# Patient Record
(demographics unavailable — no encounter records)

---

## 2024-12-11 NOTE — PROCEDURE
[FreeTextEntry3] : Large Joint Injection was performed because of pain and inflammation.   Anesthesia: ethyl chloride sprayed topically..  Kenalog: An injection of Kenalog 40 mg , 1 cc.   Lidocaine: 3 cc.   Medication was injected in the left bicipital grove. Patient has tried OTC's including aspirin, Ibuprofen, Aleve etc or prescription NSAIDS, and/or exercises at home and/ or physical therapy without satisfactory response and Patient has decreased mobility in the joint. After verbal consent using sterile preparation and technique. The risks, benefits, and alternatives to cortisone injection were explained in full to the patient. Risks outlined include but are not limited to infection, sepsis, bleeding, scarring, skin discoloration, temporary increase in pain, syncopal episode, failure to resolve symptoms, allergic reaction, symptom recurrence, and elevation of blood sugar in diabetics. Patient understood the risks. All questions were answered. After discussion of options, patient requested an injection. Oral informed consent was obtained and sterile prep was done of the injection site. Sterile technique was utilized for the procedure including the preparation of the solutions used for the injection. Patient tolerated the procedure well. Advised to ice the injection site this evening. Prep with alcohol locally to site. Sterile technique used. Patient tolerated procedure well. Post Procedure Instructions: Patient was advised to call if redness, pain, or fever occur and apply ice for 15 min. out of every hour for the next 12-24 hours as tolerated. patient was advised to rest the joint(s) for 7 days.   Ultrasound Guidance was used for the following reasons: prior failure or difficult injection.   Ultrasound guided injection was performed of the shoulder, visualization of the needle and placement of injection was performed without complication.

## 2024-12-11 NOTE — HISTORY OF PRESENT ILLNESS
[de-identified] : This is a 82yo male presenting to the office c/o ongoing left shoulder pain x couple days. Pt went to [facility] and was given a 3 day prednisone taper with excellent relief. Pt reports he overdid it over the last couple of days and pain returned. Patient reports pain radiates down to the wrist. Pain is described as constant, severe in quality. Currently pain is 7/10 and non-radiating. Patient reports pain has been getting progressively worse. Pain is worse at night. Overall pain does improve with rest and ice. Patient denies any numbness or tingling. ~Pain is localized to the anterior and lateral aspect of the shoulder.

## 2024-12-11 NOTE — PHYSICAL EXAM
[No bony abnormalities] : No bony abnormalities [Disc space narrowing] : Disc space narrowing [Type 2 acromion] : Type 2 acromion [Calcific density] : Calcific density [There are no fractures, subluxations or dislocations. No significant abnormalities are seen] : There are no fractures, subluxations or dislocations. No significant abnormalities are seen [de-identified] : Constitutional: The general appearance of the patient is well developed, well nourished, no deformities and well groomed. Normal   Gait: Gait and function is as follows: normal gait.   Skin: Head and neck visualized skin is normal. Left upper extremity visualized skin is normal. Right upper extremity visualized skin is normal. Thoracic Skin of the thoracic spine shows visualized skin is normal.   Cardiovascular: palpable radial pulse bilaterally, good capillary refill in digits of the bilateral upper extremities and no temperature or color changes in the bilateral upper extremities.   Lymphatic: Normal Palpation of lymph nodes in the cervical.   Neurologic: fine motor control in the bilateral upper extremities is intact. Deep Tendon Reflexes in Upper and Lower Extremities Negative Story's in the bilateral upper extremities. The patient is oriented to time, place and person. Sensation to light touch intact in the bilateral upper extremities. Mood and Affect is normal.   Right Shoulder: Inspection of the shoulder/upper arm is as follows: There is mild pain with range of motion of the shoulder   Left Shoulder: Inspection of the shoulder/upper arm is as follows: no scapula winging, no biceps deformity and no AC joint deformity. Palpation of the shoulder/upper arm is as follows: There is tenderness at the proximal biceps tendon. Range of motion of the shoulder is as follows: Pain with internal rotation, external rotation, abduction and forward flexion. Strength of the shoulder is as follows: Supraspinatus 4/5. External Rotation 4/5. Internal Rotation 4/5. Deltoid 5/5 Ligament Stability and Special Tests of the shoulder is as follows: Neer test is positive. De Oliveira' test is positive. Speed's test is positive.   Neck:   Inspection / Palpation of the cervical spine is as follows: mild paracervical tenderness. Range of motion of the cervical spine is as follows: moderately decreased range of motion of the cervical spine. Stability testing for the cervical spine is as follows Stable range of motion.   Back, including spine: Inspection / Palpation of the thoracic/lumbar spine is as follows: There is a full, pain free, stable range of motion of the thoracic spine with a normal tone and not tenderness to palpation..

## 2024-12-11 NOTE — DISCUSSION/SUMMARY
[de-identified] : This is a 82yo male presenting to the office c/o ongoing left shoulder pain x couple days. I personally reviewed shoulder xrays which demonstrates calcium deposit  I personally reviewed cervical xrays which demonstrate mild narrowing   Pt was treated with a biceps injection for diagnostic and therapeutic purposes Recommended a Medrol Dosepak and discussed the dosing schedule and side effects of prednisone. We also recommend the patient contact their primary care physician to discuss any potential interactions with their current medications or health issues.   (1) We discussed a comprehensive treatment plans that included a prescription management plan involving the use of prescription strength medications to include Ibuprofen 600-800 mg TID, versus 500-650 mg Tylenol. We also discussed prescribing topical diclofenac (Voltaren gel) as well as once daily Meloxicam 15 mg. (2) The patient has More Than One chronic injuries/illnesses as outlined, discussed, and documented by ICD 10 codes listed, as well as the HPI and Plan section. There is a moderate risk of morbidity with further treatment, especially from use of prescription strength medications and possible side effects of these medications which include upset stomach and cardiac/renal issues with long term use were discussed. (3) I recommended that the patient follow-up with their medical physician to discuss any significant specific potential issues with long term use such as interactions with current medications or with exacerbation of underlying medical morbidities.   Attestation: I, Chantell SANTOS'Damaris , attest that this documentation has been prepared under the direction and in the presence of Provider Dru Restrepo MD. The documentation recorded by the scribe, in my presence, accurately reflects the service I personally performed, and the decisions made by me with my edits as appropriate. Dru Restrepo MD

## 2024-12-16 NOTE — PROCEDURE
[FreeTextEntry3] : Large Joint Injection was performed because of pain and inflammation.  Anesthesia: ethyl chloride sprayed topically..  Depomedrol: An injection of Depomedrol 40 mg , 1 cc.  Lidocaine: 7 cc.   Medication was injected in the left subacromial space. Patient has tried OTC's including aspirin, Ibuprofen, Aleve etc or prescription NSAIDS, and/or exercises at home and/ or physical therapy without satisfactory response and Patient has decreased mobility in the joint. After verbal consent using sterile preparation and technique. The risks, benefits, and alternatives to cortisone injection were explained in full to the patient. Risks outlined include but are not limited to infection, sepsis, bleeding, scarring, skin discoloration, temporary increase in pain, syncopal episode, failure to resolve symptoms, allergic reaction, symptom recurrence, and elevation of blood sugar in diabetics. Patient understood the risks. All questions were answered. After discussion of options, patient requested an injection. Oral informed consent was obtained and sterile prep was done of the injection site. Sterile technique was utilized for the procedure including the preparation of the solutions used for the injection. Patient tolerated the procedure well. Advised to ice the injection site this evening. Prep with alcohol locally to site. Sterile technique used. Patient tolerated procedure well. Post Procedure Instructions: Patient was advised to call if redness, pain, or fever occur and apply ice for 15 min. out of every hour for the next 12-24 hours as tolerated. patient was advised to rest the joint(s) for 7 days.

## 2024-12-16 NOTE — HISTORY OF PRESENT ILLNESS
[de-identified] : This is a 82yo male presenting to the office c/o ongoing left shoulder pain x couple days. Pt went to [facility] and was given a 3 day prednisone taper with excellent relief. Pt reports he overdid it over the last couple of days and pain returned. Patient reports pain radiates down to the wrist. Pain is described as constant, severe in quality. Currently pain is 7/10 and non-radiating. Patient reports pain has been getting progressively worse. Pain is worse at night. Overall pain does improve with rest and ice. Patient denies any numbness or tingling. ~Pain is localized to the anterior and lateral aspect of the shoulder. 12/16/24: Patient returns today with exacerbation of severe lateral shoulder pain.  Previous ultrasound-guided biceps injection provided minimal improvement.  He stated he completed Medrol Dosepak with no significant improvement.  Patient is having difficulty sleeping at night.  He has limited range of motion secondary to pain.

## 2024-12-16 NOTE — PHYSICAL EXAM
[No bony abnormalities] : No bony abnormalities [Disc space narrowing] : Disc space narrowing [Type 2 acromion] : Type 2 acromion [Calcific density] : Calcific density [There are no fractures, subluxations or dislocations. No significant abnormalities are seen] : There are no fractures, subluxations or dislocations. No significant abnormalities are seen [de-identified] : Constitutional: The general appearance of the patient is well developed, well nourished, no deformities and well groomed. Normal   Gait: Gait and function is as follows: normal gait.   Skin: Head and neck visualized skin is normal. Left upper extremity visualized skin is normal. Right upper extremity visualized skin is normal. Thoracic Skin of the thoracic spine shows visualized skin is normal.   Cardiovascular: palpable radial pulse bilaterally, good capillary refill in digits of the bilateral upper extremities and no temperature or color changes in the bilateral upper extremities.   Lymphatic: Normal Palpation of lymph nodes in the cervical.   Neurologic: fine motor control in the bilateral upper extremities is intact. Deep Tendon Reflexes in Upper and Lower Extremities Negative Story's in the bilateral upper extremities. The patient is oriented to time, place and person. Sensation to light touch intact in the bilateral upper extremities. Mood and Affect is normal.   Right Shoulder: Inspection of the shoulder/upper arm is as follows: There is mild pain with range of motion of the shoulder   Left Shoulder: Inspection of the shoulder/upper arm is as follows: no scapula winging, no biceps deformity and no AC joint deformity. Palpation of the shoulder/upper arm is as follows: There is tenderness at the proximal biceps tendon. Range of motion of the shoulder is as follows: Pain with internal rotation, external rotation, abduction and forward flexion. Strength of the shoulder is as follows: Supraspinatus 4/5. External Rotation 4/5. Internal Rotation 4/5. Deltoid 5/5 Ligament Stability and Special Tests of the shoulder is as follows: Neer test is positive. De Oliveira' test is positive. Speed's test is positive.   Neck:   Inspection / Palpation of the cervical spine is as follows: mild paracervical tenderness. Range of motion of the cervical spine is as follows: moderately decreased range of motion of the cervical spine. Stability testing for the cervical spine is as follows Stable range of motion.   Back, including spine: Inspection / Palpation of the thoracic/lumbar spine is as follows: There is a full, pain free, stable range of motion of the thoracic spine with a normal tone and not tenderness to palpation..

## 2024-12-16 NOTE — PHYSICAL EXAM
[No bony abnormalities] : No bony abnormalities [Disc space narrowing] : Disc space narrowing [Type 2 acromion] : Type 2 acromion [Calcific density] : Calcific density [There are no fractures, subluxations or dislocations. No significant abnormalities are seen] : There are no fractures, subluxations or dislocations. No significant abnormalities are seen [de-identified] : Constitutional: The general appearance of the patient is well developed, well nourished, no deformities and well groomed. Normal   Gait: Gait and function is as follows: normal gait.   Skin: Head and neck visualized skin is normal. Left upper extremity visualized skin is normal. Right upper extremity visualized skin is normal. Thoracic Skin of the thoracic spine shows visualized skin is normal.   Cardiovascular: palpable radial pulse bilaterally, good capillary refill in digits of the bilateral upper extremities and no temperature or color changes in the bilateral upper extremities.   Lymphatic: Normal Palpation of lymph nodes in the cervical.   Neurologic: fine motor control in the bilateral upper extremities is intact. Deep Tendon Reflexes in Upper and Lower Extremities Negative Story's in the bilateral upper extremities. The patient is oriented to time, place and person. Sensation to light touch intact in the bilateral upper extremities. Mood and Affect is normal.   Right Shoulder: Inspection of the shoulder/upper arm is as follows: There is mild pain with range of motion of the shoulder   Left Shoulder: Inspection of the shoulder/upper arm is as follows: no scapula winging, no biceps deformity and no AC joint deformity. Palpation of the shoulder/upper arm is as follows: There is tenderness at the proximal biceps tendon. Range of motion of the shoulder is as follows: Pain with internal rotation, external rotation, abduction and forward flexion. Strength of the shoulder is as follows: Supraspinatus 4/5. External Rotation 4/5. Internal Rotation 4/5. Deltoid 5/5 Ligament Stability and Special Tests of the shoulder is as follows: Neer test is positive. De Oliveira' test is positive. Speed's test is positive.   Neck:   Inspection / Palpation of the cervical spine is as follows: mild paracervical tenderness. Range of motion of the cervical spine is as follows: moderately decreased range of motion of the cervical spine. Stability testing for the cervical spine is as follows Stable range of motion.   Back, including spine: Inspection / Palpation of the thoracic/lumbar spine is as follows: There is a full, pain free, stable range of motion of the thoracic spine with a normal tone and not tenderness to palpation..

## 2024-12-16 NOTE — DISCUSSION/SUMMARY
[de-identified] : This is a 82yo male presenting to the office c/o ongoing left shoulder pain x 1 week We initially completed x-rays of the left shoulder and cervical spine. X-rays of the left shoulder demonstrate mild degenerative changes with questionable calcium deposit. X-rays of the cervical spine demonstrate moderate degenerative changes.  Patient was initially treated with ultrasound-guided biceps injection which provided minimal improvement. Currently he reports severe lateral pain consistent with significant inflammatory bursitis. Today he was treated subacromial cortisone injection for diagnostic and therapy purposes. Considering the fact the patient has had severe pain and inability to the sleep he was prescribed short course of tramadol to up alleviate nighttime pain.  Discussed if injection provides minimal relief we would consider MRI of the cervical spine and follow-up with spine specialist.   (1) We discussed a comprehensive treatment plans that included a prescription management plan involving the use of prescription strength medications to include Ibuprofen 600-800 mg TID, versus 500-650 mg Tylenol. We also discussed prescribing topical diclofenac (Voltaren gel) as well as once daily Meloxicam 15 mg. (2) The patient has More Than One chronic injuries/illnesses as outlined, discussed, and documented by ICD 10 codes listed, as well as the HPI and Plan section. There is a moderate risk of morbidity with further treatment, especially from use of prescription strength medications and possible side effects of these medications which include upset stomach and cardiac/renal issues with long term use were discussed. (3) I recommended that the patient follow-up with their medical physician to discuss any significant specific potential issues with long term use such as interactions with current medications or with exacerbation of underlying medical morbidities.

## 2024-12-16 NOTE — HISTORY OF PRESENT ILLNESS
[de-identified] : This is a 84yo male presenting to the office c/o ongoing left shoulder pain x couple days. Pt went to [facility] and was given a 3 day prednisone taper with excellent relief. Pt reports he overdid it over the last couple of days and pain returned. Patient reports pain radiates down to the wrist. Pain is described as constant, severe in quality. Currently pain is 7/10 and non-radiating. Patient reports pain has been getting progressively worse. Pain is worse at night. Overall pain does improve with rest and ice. Patient denies any numbness or tingling. ~Pain is localized to the anterior and lateral aspect of the shoulder. 12/16/24: Patient returns today with exacerbation of severe lateral shoulder pain.  Previous ultrasound-guided biceps injection provided minimal improvement.  He stated he completed Medrol Dosepak with no significant improvement.  Patient is having difficulty sleeping at night.  He has limited range of motion secondary to pain.

## 2024-12-16 NOTE — DISCUSSION/SUMMARY
[de-identified] : This is a 84yo male presenting to the office c/o ongoing left shoulder pain x 1 week We initially completed x-rays of the left shoulder and cervical spine. X-rays of the left shoulder demonstrate mild degenerative changes with questionable calcium deposit. X-rays of the cervical spine demonstrate moderate degenerative changes.  Patient was initially treated with ultrasound-guided biceps injection which provided minimal improvement. Currently he reports severe lateral pain consistent with significant inflammatory bursitis. Today he was treated subacromial cortisone injection for diagnostic and therapy purposes. Considering the fact the patient has had severe pain and inability to the sleep he was prescribed short course of tramadol to up alleviate nighttime pain.  Discussed if injection provides minimal relief we would consider MRI of the cervical spine and follow-up with spine specialist.   (1) We discussed a comprehensive treatment plans that included a prescription management plan involving the use of prescription strength medications to include Ibuprofen 600-800 mg TID, versus 500-650 mg Tylenol. We also discussed prescribing topical diclofenac (Voltaren gel) as well as once daily Meloxicam 15 mg. (2) The patient has More Than One chronic injuries/illnesses as outlined, discussed, and documented by ICD 10 codes listed, as well as the HPI and Plan section. There is a moderate risk of morbidity with further treatment, especially from use of prescription strength medications and possible side effects of these medications which include upset stomach and cardiac/renal issues with long term use were discussed. (3) I recommended that the patient follow-up with their medical physician to discuss any significant specific potential issues with long term use such as interactions with current medications or with exacerbation of underlying medical morbidities.